# Patient Record
Sex: FEMALE | Race: BLACK OR AFRICAN AMERICAN | NOT HISPANIC OR LATINO | URBAN - METROPOLITAN AREA
[De-identification: names, ages, dates, MRNs, and addresses within clinical notes are randomized per-mention and may not be internally consistent; named-entity substitution may affect disease eponyms.]

---
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
## 2019-06-19 ENCOUNTER — OFFICE VISIT (OUTPATIENT)
Dept: CARDIOLOGY | Age: 19
End: 2019-06-19

## 2019-06-19 VITALS
SYSTOLIC BLOOD PRESSURE: 138 MMHG | DIASTOLIC BLOOD PRESSURE: 95 MMHG | HEART RATE: 84 BPM | HEIGHT: 65 IN | WEIGHT: 153 LBS | BODY MASS INDEX: 25.49 KG/M2

## 2019-06-19 DIAGNOSIS — R00.2 PALPITATION: ICD-10-CM

## 2019-06-19 DIAGNOSIS — R07.2 PRECORDIAL PAIN: Primary | ICD-10-CM

## 2019-06-19 DIAGNOSIS — R06.02 SOB (SHORTNESS OF BREATH): ICD-10-CM

## 2019-06-19 PROCEDURE — 99213 OFFICE O/P EST LOW 20 MIN: CPT | Performed by: INTERNAL MEDICINE

## 2019-06-19 PROCEDURE — 93000 ELECTROCARDIOGRAM COMPLETE: CPT | Performed by: INTERNAL MEDICINE

## 2019-06-19 NOTE — PROGRESS NOTES
" Subjective:        Humberto Randhawa is a 19 y.o. female who here for follow up    CC  CHEST PAIN SQUIZZING    FAST HEART BEATS    HPI  19-year-old female had an episode of chest pains tightness in the chest retrosternal moderate in intensity along with palpitations and shortness of breath    Chest pains were more of a squeezing along with significant fast heartbeats     Problem List Items Addressed This Visit        Cardiovascular and Mediastinum    Palpitation    Relevant Orders    Stress Test With Myocardial Perfusion (1 Day)       Respiratory    SOB (shortness of breath)    Relevant Orders    Stress Test With Myocardial Perfusion (1 Day)      Other Visit Diagnoses     Precordial pain    -  Primary    Relevant Orders    Stress Test With Myocardial Perfusion (1 Day)        .    The following portions of the patient's history were reviewed and updated as appropriate: allergies, current medications, past family history, past medical history, past social history, past surgical history and problem list.    Past Medical History:   Diagnosis Date   • Rapid heart rate      reports that she has never smoked. She has never used smokeless tobacco. She reports that she does not drink alcohol or use drugs.   Family History   Problem Relation Age of Onset   • Stroke Mother    • Hypertension Mother    • Hyperlipidemia Mother    • Heart disease Mother    • Heart disease Maternal Grandfather    • Hyperlipidemia Maternal Grandfather    • Heart attack Maternal Grandfather        Review of Systems  Constitutional: No wt loss, fever, fatigue  Gastrointestinal: No nausea, abdominal pain  Behavioral/Psych: No insomnia or anxiety   Cardiovascular chest pain and palpitation  Objective:       Physical Exam  /95   Pulse 84   Ht 165.1 cm (65\")   Wt 69.4 kg (153 lb)   BMI 25.46 kg/m²   General appearance: No acute changes   Neck: Trachea midline; NECK, supple, no thyromegaly or lymphadenopathy   Lungs: Normal size and shape, normal " breath sounds, equal distribution of air, no rales and rhonchi   CV: S1-S2 regular, no murmurs, no rub, no gallop   Abdomen: Soft, non-tender; no masses , no abnormal abdominal sounds   Extremities: No deformity , normal color , no peripheral edema   Skin: Normal temperature, turgor and texture; no rash, ulcers            ECG 12 Lead  Date/Time: 6/19/2019 4:54 PM  Performed by: Isaak De La Cruz MD  Authorized by: Isaak De La Cruz MD   Comparison: compared with previous ECG   Similar to previous ECG  Rhythm: sinus rhythm  ST Flattening: all    Clinical impression: non-specific ECG              Echocardiogram:    Interpretation Summary     · Mild tricuspid valve regurgitation is present.  · Right ventricular cavity is borderline dilated.  · Calculated EF = 59%.  · There is no evidence of pericardial effusion   Interpretation Summary     · An abnormal monitor study.  · NSR, SINUS TACH WITH RARE PACS         No current outpatient medications on file.   Assessment:        Patient Active Problem List   Diagnosis   • Shingles   • Palpitation   • SOB (shortness of breath)               Plan:            ICD-10-CM ICD-9-CM   1. Precordial pain R07.2 786.51   2. Palpitation R00.2 785.1   3. SOB (shortness of breath) R06.02 786.05     1. Precordial pain  Considering the patient's symptoms as well as clinical situation and  EKG findings, along with cardiac risk factors, ischemic workup is necessary to rule out ischemic cardiomyopathy, stress induced arrhythmias, and functional capacity for diagnosis as well as prognostic consideration    - Stress Test With Myocardial Perfusion (1 Day)    2. Palpitation  Considering the patient's symptoms as well as clinical situation and  EKG findings, along with cardiac risk factors, ischemic workup is necessary to rule out ischemic cardiomyopathy, stress induced arrhythmias, and functional capacity for diagnosis as well as prognostic consideration    - Stress Test With Myocardial  Perfusion (1 Day)    3. SOB (shortness of breath)  Considering the patient's symptoms as well as clinical situation and  EKG findings, along with cardiac risk factors, ischemic workup is necessary to rule out ischemic cardiomyopathy, stress induced arrhythmias, and functional capacity for diagnosis as well as prognostic consideration    - Stress Test With Myocardial Perfusion (1 Day)       BP CHECK AT HOME    STRESS CARDIOLYTE  COUNSELING:    Humberto Shore was given to patient for the following topics: diagnostic results, risk factor reductions, impressions, risks and benefits of treatment options and importance of treatment compliance .       SMOKING COUNSELING:    Counseling given: Not Answered      Dictated using Dragon dictation

## 2019-06-21 PROBLEM — R07.2 PRECORDIAL PAIN: Status: ACTIVE | Noted: 2019-06-21

## 2019-07-17 ENCOUNTER — HOSPITAL ENCOUNTER (OUTPATIENT)
Dept: CARDIOLOGY | Facility: HOSPITAL | Age: 19
Discharge: HOME | End: 2019-07-17

## 2019-07-17 VITALS
DIASTOLIC BLOOD PRESSURE: 78 MMHG | HEIGHT: 65 IN | RESPIRATION RATE: 16 BRPM | OXYGEN SATURATION: 99 % | WEIGHT: 153 LBS | HEART RATE: 75 BPM | BODY MASS INDEX: 25.49 KG/M2 | SYSTOLIC BLOOD PRESSURE: 118 MMHG

## 2019-07-17 PROCEDURE — A9500 TC99M SESTAMIBI: HCPCS | Performed by: INTERNAL MEDICINE

## 2019-07-17 PROCEDURE — 78452 HT MUSCLE IMAGE SPECT MULT: CPT

## 2019-07-17 PROCEDURE — 0 TECHNETIUM SESTAMIBI: Performed by: INTERNAL MEDICINE

## 2019-07-17 PROCEDURE — 93018 CV STRESS TEST I&R ONLY: CPT | Performed by: INTERNAL MEDICINE

## 2019-07-17 PROCEDURE — 78452 HT MUSCLE IMAGE SPECT MULT: CPT | Performed by: INTERNAL MEDICINE

## 2019-07-17 PROCEDURE — 93017 CV STRESS TEST TRACING ONLY: CPT

## 2019-07-17 PROCEDURE — 93016 CV STRESS TEST SUPVJ ONLY: CPT | Performed by: NURSE PRACTITIONER

## 2019-07-17 RX ADMIN — TECHNETIUM TC 99M SESTAMIBI 1 DOSE: 1 INJECTION INTRAVENOUS at 11:42

## 2019-07-17 RX ADMIN — TECHNETIUM TC 99M SESTAMIBI 1 DOSE: 1 INJECTION INTRAVENOUS at 08:15

## 2019-07-18 LAB
BH CV STRESS BP STAGE 1: NORMAL
BH CV STRESS BP STAGE 2: NORMAL
BH CV STRESS BP STAGE 3: NORMAL
BH CV STRESS DURATION MIN STAGE 1: 3
BH CV STRESS DURATION MIN STAGE 2: 3
BH CV STRESS DURATION MIN STAGE 3: 3
BH CV STRESS DURATION SEC STAGE 1: 0
BH CV STRESS DURATION SEC STAGE 2: 0
BH CV STRESS DURATION SEC STAGE 3: 42
BH CV STRESS GRADE STAGE 1: 10
BH CV STRESS GRADE STAGE 2: 12
BH CV STRESS GRADE STAGE 3: 14
BH CV STRESS HR STAGE 1: 111
BH CV STRESS HR STAGE 2: 141
BH CV STRESS HR STAGE 3: 181
BH CV STRESS METS STAGE 1: 4.6
BH CV STRESS METS STAGE 2: 7
BH CV STRESS METS STAGE 3: 10.1
BH CV STRESS PROTOCOL 1: NORMAL
BH CV STRESS RECOVERY BP: NORMAL MMHG
BH CV STRESS RECOVERY HR: 96 BPM
BH CV STRESS RECOVERY O2: 98 %
BH CV STRESS SPEED STAGE 1: 1.7
BH CV STRESS SPEED STAGE 2: 2.5
BH CV STRESS SPEED STAGE 3: 3.4
BH CV STRESS STAGE 1: 1
BH CV STRESS STAGE 2: 2
BH CV STRESS STAGE 3: 3
LV EF NUC BP: 61 %
MAXIMAL PREDICTED HEART RATE: 200 BPM
PERCENT MAX PREDICTED HR: 90.5 %
STRESS BASELINE BP: NORMAL MMHG
STRESS BASELINE HR: 76 BPM
STRESS O2 SAT REST: 98 %
STRESS PERCENT HR: 106 %
STRESS POST ESTIMATED WORKLOAD: 10.1 METS
STRESS POST EXERCISE DUR MIN: 9 MIN
STRESS POST EXERCISE DUR SEC: 42 SEC
STRESS POST PEAK BP: NORMAL MMHG
STRESS POST PEAK HR: 181 BPM
STRESS TARGET HR: 170 BPM

## 2019-07-25 ENCOUNTER — OFFICE VISIT (OUTPATIENT)
Dept: CARDIOLOGY | Facility: CLINIC | Age: 19
End: 2019-07-25

## 2019-07-25 VITALS — HEART RATE: 79 BPM | SYSTOLIC BLOOD PRESSURE: 116 MMHG | DIASTOLIC BLOOD PRESSURE: 88 MMHG

## 2019-07-25 DIAGNOSIS — R00.2 PALPITATIONS: Primary | ICD-10-CM

## 2019-07-25 DIAGNOSIS — R07.2 PRECORDIAL PAIN: ICD-10-CM

## 2019-07-25 DIAGNOSIS — R06.02 SOB (SHORTNESS OF BREATH): ICD-10-CM

## 2019-07-25 PROCEDURE — 99213 OFFICE O/P EST LOW 20 MIN: CPT | Performed by: INTERNAL MEDICINE
